# Patient Record
Sex: FEMALE | Race: WHITE
[De-identification: names, ages, dates, MRNs, and addresses within clinical notes are randomized per-mention and may not be internally consistent; named-entity substitution may affect disease eponyms.]

---

## 2019-01-21 NOTE — UC
Lower Extremity/Ankle HPI





- HPI Summary


HPI Summary: 


28 year old female presents for right ankle pain, bruising, and swelling. 

States last night she accidentally stepped on a cylindrical object causing her 

to invert her right ankle. States she was unable to bear any weight on the 

ankle immediately after the injury however was able to bear weight in the 

clinic with some discomfort. States pain is primarily only with weight bearing. 

Has had multiple sprains of this ankle in the past. Denies numbness or 

tingling.  LMP 2018.


 





- History of Current Complaint


Chief Complaint: UCLowerExtremity


Stated Complaint: RIGHT ANKLE INJURY


Time Seen by Provider: 19 15:26


Hx Obtained From: Patient


Hx Last Menstrual Period: 2018


Pain Intensity: 5





- Allergies/Home Medications


Allergies/Adverse Reactions: 


 Allergies











Allergy/AdvReac Type Severity Reaction Status Date / Time


 


No Known Allergies Allergy   Verified 19 15:37











Home Medications: 


 Home Medications





Acetaminophen [Acetaminophen Extra Strength] 1,500 mg PO PRN 19 [History]











PMH/Surg Hx/FS Hx/Imm Hx


Neurological History: Migraine





- Surgical History


Surgical History: Yes


Surgery Procedure, Year, and Place: CHOLECYSTECTOMY





- Family History


Known Family History: Positive: Non-Contributory





- Social History


Occupation: Employed Full-time


Lives: With Family


Alcohol Use: None


Substance Use Type: Marijuana


Smoking Status (MU): Never Smoked Tobacco


Amount Used/How Often: VAPES





Review of Systems


All Other Systems Reviewed And Are Negative: Yes


Constitutional: Positive: Negative


Skin: Positive: Bruising


Respiratory: Positive: Negative


Cardiovascular: Positive: Negative


Gastrointestinal: Positive: Negative


Genitourinary: Positive: Negative


Motor: Negative: Weakness


Neurovascular: Negative: Decreased Sensation


Musculoskeletal: Positive: Other: - See HPI


Neurological: Positive: Negative


Is Patient Immunocompromised?: No





Physical Exam





- Summary


Physical Exam Summary: 


GENERAL APPEARANCE: Well developed, well nourished, alert and cooperative, and 

appears to be in no acute distress.





CARDIAC: Normal S1 and S2. No S3, S4 or murmurs. Rhythm is regular. There is no 

peripheral edema, cyanosis or pallor. Extremities are warm and well perfused. 

Capillary refill is less than 2 seconds. Peripheral pulses intact.





LUNGS: Clear to auscultation without rales, rhonchi, wheezing or diminished 

breath sounds.





ABDOMEN: Positive bowel sounds. Soft, nondistended, nontender. No guarding or 

rebound. No masses or hepatosplenomegally.





MUSKULOSKELETAL: Tenderness to right lateral malleolus with palpation. Mild 

ecchymosis with moderate swelling present. No gross deformity. Limping gait.





NEUROLOGICAL: Strength and sensation symmetric and intact.





SKIN: Skin normal color, texture and turgor with no lesions or eruptions.





Triage Information Reviewed: Yes


Vital Signs: 


 Initial Vital Signs











Temp  97.4 F   19 15:33


 


Pulse  97   19 15:33


 


Resp  16   19 15:33


 


BP  133/67   19 15:33


 


Pulse Ox  99   19 15:33











Vital Signs Reviewed: Yes





Diagnostics





- Radiology


  ** No standard instances


Radiology Interpretation Completed By: ED Physician - Negative for fracture., 

Radiologist


Summary of Radiographic Findings: Patient Name: OLINDA HARTMAN Medical Record#: 

[id number].  Ordering Physician: Zacarias Ngo NP Acct.#: [id number].  

: 1990 Age: 28 Sex: F Location: [facility].  Exam Date: 1535 ADM Status: REG ER.  Order Information: ANKLE RIGHT 3+VWS.  

Accession Number: [id number].  CPT: 39289.  HISTORY: pain s/p inversion 

injury.  COMPARISONS: None.  VIEWS: 3 , Frontal, lateral, and oblique views of 

the right ankle.  FINDINGS:  BONE DENSITY: Normal.  BONES: There is no 

displaced fracture.  JOINTS: There is no arthropathy.  ALIGNMENT: There is no 

dislocation.  SOFT TISSUES: There is circumferential soft tissue swelling.  

OTHER FINDINGS: None.  IMPRESSION:  SOFT TISSUE SWELLING. NO ACUTE OSSEOUS 

INJURY. IF SYMPTOMS PERSIST, RECOMMEND REPEAT.  IMAGING.





Lower Extremity Course/Dx





- Course


Course Of Treatment: 28 year old female presents for right ankle pain, bruising

, and swelling. States last night she accidentally stepped on a cylindrical 

object causing her to invert her right ankle. States she was unable to bear any 

weight on the ankle immediately after the injury however was able to bear 

weight in the clinic with some discomfort. States pain is primarily only with 

weight bearing. Has had multiple sprains of this ankle in the past. Denies 

numbness or tingling. Afebrile. VSS. Exam reveals an adult female in no acute 

distress. Tenderness to right lateral malleolus with palpation. Mild ecchymosis 

with moderate swelling present. No gross deformity. X-ray shows no acute 

fracture. Will treat for right ankle sprain. Patient placed in ACE wrap by RN. 

Circulation and sensation intact pre- and post-application. Provided crutches 

for progressive weightbearing. Have prescribed naproxen 500 mg BID for pain. 

Recommend RICE. She is to follow up with orthopedic surgery in 7 days if no 

improvement in symptoms. Warning symptoms requiring immediate evaluation were 

reviewed with patient. Verbalizes understanding and agrees with POC.





- Differential Dx/Diagnosis


Differential Diagnosis/HQI/PQRI: Contusion, Dislocation, Fracture (Closed), 

Sprain


Provider Diagnosis: 


 Right ankle sprain








Discharge





- Sign-Out/Discharge


Documenting (check all that apply): Patient Departure


All imaging exams completed and their final reports reviewed: Yes





- Discharge Plan


Condition: Stable


Disposition: HOME


Prescriptions: 


Naproxen [Naproxen 500 mg tab] 500 mg PO BID #30 tablet


Patient Education Materials:  Ankle Sprain (ED)


Referrals: 


Eliud MORALES,Deric TORRES [Primary Care Provider] - 


Osvaldo Lawler MD [Medical Doctor] - 7 Days (If no improvement in symptoms.)


Additional Instructions: 


The x-ray performed in the clinic today showed evidence of a fracture. Your 

pain is most likely a sprain of the ankle.





Rest the foot/ankle as much as possible. Use the crutches provided to you to 

slowly bear weight as tolerated until able to ambulate pain-free.





Apply ice to the affected area for 15-20 minutes at least 4 times a day for 

next few days to help reduce pain and swelling.





Keep the foot elevated while sitting to reduce swelling.





Take naproxen 500 mg 1 tab every 12 hours with food for next 5-7 days then may 

take every 12 hours as needed for pain.





Follow up with Dr. Lawler, orthopedic surgery, in 7 days if no improvement in 

symptoms. Call for appointment.





Seek immediate medical attention in the emergency room if you have pain not 

managed with pain medication, increased swelling, the foot/toes become pale or 

blue in color, you develop numbness or tingling in your foot/toes, or any 

worsening of symptoms.





- Billing Disposition and Condition


Condition: STABLE


Disposition: Home





- Attestation Statements


Provider Attestation: 





I was available for consult. This patient was seen by the MICK. The patient was 

not presented to, seen by, or examined by me. -Maurice

## 2020-03-08 ENCOUNTER — HOSPITAL ENCOUNTER (EMERGENCY)
Dept: HOSPITAL 25 - ED | Age: 30
Discharge: HOME | End: 2020-03-08
Payer: SELF-PAY

## 2020-03-08 VITALS — SYSTOLIC BLOOD PRESSURE: 127 MMHG | DIASTOLIC BLOOD PRESSURE: 86 MMHG

## 2020-03-08 DIAGNOSIS — Z77.21: Primary | ICD-10-CM

## 2020-03-08 DIAGNOSIS — F17.290: ICD-10-CM

## 2020-03-08 PROCEDURE — 99281 EMR DPT VST MAYX REQ PHY/QHP: CPT
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
